# Patient Record
Sex: FEMALE | Race: WHITE | NOT HISPANIC OR LATINO | URBAN - METROPOLITAN AREA
[De-identification: names, ages, dates, MRNs, and addresses within clinical notes are randomized per-mention and may not be internally consistent; named-entity substitution may affect disease eponyms.]

---
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
## 2025-03-20 NOTE — PROCEDURE: MEDICATION COUNSELING
MEDICAL ONCOLOGY - NEW PATIENT VISIT    Best Contact Phone Number(s): [phone] (home)      Cancer/Stage/TNM:    Cancer Staging   Squamous Cell Carcinoma of right lower lobe of lung  Staging form: Lung, AJCC 8th Edition  - Clinical stage from 1/11/2023: Stage IA2 (cT1b, cN0, cM0) - Signed by Xavier Deng MD on 1/11/2023       Reason for visit: Metachronous recurrence of RCC      Treatment:  08/2013    Partial nephrectomy      HPI: Sarina Genao is a 76 y.o. female found to have metastatic ccRCC 01/2025 in the setting of incidental finding of an anterior abdominal wall mass during surveillance of prior lung cancer. She has remote history of grade 2 ccRCC sp partial nephrectomy 08/2013.  Subsquently underwent radiation therapy to an early stage squamous cell lung cancer 01/2023. Around this time, was noted to have abdominal wall subcutaneous nodule.  FDG PET 11/2024 showed progressive 3.9cm anterior abdominal wall mass; biopsy 01/2025 consistetn with metatsatic ccRCC. There was also a 2.3 cm left pulmonary nodule of concern and an additional RITA nodule. She presents to medical oncology clinic for further treatment planning.    History has been obtained by chart review and discussion with the patient.    Interval Events:    Had not smoked in 2 weeks because of the flu. Did have one cigarette yesterday and today. No complaints otherwise. Plan to see rad onc next week for RT to right lung nodule.         Oncology History   Renal cell carcinoma   8/6/2013 Surgery    08/06/2013: Partial right nephrectomy.   Procedure: Partial nephrectomy. Specimen Laterality: Right Tumor Size: 4 cm in greatest dimension. Tumor Focality: Unifocal. Macroscopic Extent Of Tumor: Tumor limited to the kidney. Histologic Type: Clear cell renal cell carcinoma. Sarcomatoid features: Not identified. Histologic Grade (Heron nuclear grade): G2. Microscopic Tumor Extension: Tumor limited to the kidney Margins: Uninvolved by invasive  carcinoma. Pathologic Staging: Primary tumor: pT1a Regional lymph node: pNx Distant metastasis: Not applicable.      11/25/2022 Imaging Significant Findings    11/25/22 PET CT  Impression:  - Hypermetabolic 1.5 cm nodule in the right lower lobe of the lung concerning for primary neoplasm.  No hypermetabolic lymphadenopathy.  - Round soft tissue structure within the anterior abdominal wall measuring up to 2.3 cm with mild radiotracer uptake.  Recommend further assessment with dedicated ultrasound and/or percutaneous sampling as clinically warranted.  - Additional pulmonary nodules without significant FDG avidity as above.  Findings include a new 3 mm nodule in the left upper lobe.  Of note, subcentimeter nodules are likely too small to characterize with PET.  Recommend continued CT surveillance.     11/22/2024 Imaging Significant Findings    11/22/24 FDG PET  Impression:  - Patient with history of right lower lobe non-small cell lung cancer status post radiation therapy.  No evidence of tracer avid disease or metastasis.  - Interval enlargement of a 3.9 cm anterior abdominal wall mass with mild increased tracer uptake.  Recommend further evaluation with ultrasound and tissue sampling is warranted.     1/6/2025 Biopsy    01/06/25 Biopsy    RIGHT ABDOMINAL WALL, CT-GUIDED BIOPSY WITH PATHOLOGIST ASSISTED ADEQUACY (CYTOLOGY AND CELL BLOCK):  Positive for malignancy, consistent with renal cell carcinoma.  See comment.        1/31/2025 Imaging Significant Findings    1/31/25 Tc99m Bone scan  Impression:  - There is no scintigraphic evidence of osteoblastic metastatic disease.    1/31/25 CT torso  Impression:  - Decreased size of a right anterior abdominal wall mass.  - Stable pulmonary nodules.     4/10/2025 -  Chemotherapy    Treatment Summary   Plan Name: NIVOLUMAB 480MG Q4W + CABOZANTINIB 40MG QD  Treatment Goal: Palliative  Status: Active  Start Date: 4/10/2025 (Planned)  End Date: 7/4/2025 (Planned)  Provider: Paxton  QUIN Santiago MD  Chemotherapy: cabozantinib (CABOMETYX) 40 mg Tab, 40 mg, Oral, Daily, 0 of 1 cycle, Start date: 4/10/2025, End date: 5/8/2025     Squamous Cell Carcinoma of right lower lobe of lung   10/25/2022 Biopsy    10/25/22 Lung biopsy  1. Lung, right lower lobe, endobronchial brushing, cytologic evaluation with  cell block:  Atypical cells present.  Epithelial atypia.  Findings suggestive of squamous cell carcinoma  2. Lung, right lower lobe, endoscopic fine needle aspiration, cytologic  evaluation with cell block:  Positive for malignancy. Lung squamous cell carcinoma.  3. Lung, right lower lobe, transbronchial biopsy, cytologic evaluation with  cell block:  Positive for malignancy, Lung squamous cell carcinoma  - PD-L1 and Lung NGS Panel have been ordered and results will be issued in a  supplemental report.  4. Lung, right lower lobe, bronchoalveolar lavage, cytologic evaluation with  cell block:  Negative for malignant cells.  Reactive bronchial cells.  Macrophages.  5. Lung, left lower lobe, endoscopic fine needle aspiration, cytologic  evaluation with cell block:  Negative for malignant cells.  Reactive bronchial cells.  Macrophages.  6. Lymph node, Station 7, endoscopic fine needle aspiration, cytologic  evalu ation with cell block:  Negative for malignant cells. Reactive bronchial cells.  Lymphocytes present.      11/25/2022 Imaging Significant Findings    11/25/22 PET CT  Impression:  - Hypermetabolic 1.5 cm nodule in the right lower lobe of the lung concerning for primary neoplasm.  No hypermetabolic lymphadenopathy.  - Round soft tissue structure within the anterior abdominal wall measuring up to 2.3 cm with mild radiotracer uptake.  Recommend further assessment with dedicated ultrasound and/or percutaneous sampling as clinically warranted.  - Additional pulmonary nodules without significant FDG avidity as above.  Findings include a new 3 mm nodule in the left upper lobe.  Of note, subcentimeter  nodules are likely too small to characterize with PET.  Recommend continued CT surveillance.     11/30/2022 Initial Diagnosis    Squamous Cell Carcinoma of right lower lobe of lung     1/11/2023 Cancer Staged    Staging form: Lung, AJCC 8th Edition  - Clinical stage from 1/11/2023: Stage IA2 (cT1b, cN0, cM0)     1/17/2023 Imaging Significant Findings    01/17/23 CT Chest  Impression:  1. Interval enlargement of a right lower lobe nodule, now 2.3 cm maximally (previously 1.5 cm).  This was noted hypermetabolic on the prior PET-CT 09/29/2022.  2. There is a newly seen peripheral, right lower lobe irregular nodule up to 1.3 cm. v this was a site of thoracotomy and may be postsurgical in nature.  3. There other pulmonary nodules as described above which do not appear changed.  There is no new lymphadenopathy detected.  4. There is a new small right pleural effusion and peripheral airspace opacities in the right lung.  This may reflect infectious/inflammatory etiology.  Alternatively, this may reflect involving appearance of postsurgical change.     1/26/2023 - 2/3/2023 Radiation Therapy    Treating physician: Xavier Deng    Site Technique Energy Dose/Fx (Gy) #Fx Total Dose (Gy)   RLL Lung SBRT 6X 12.5 4 / 4 50         Biopsy       4/26/2023 Imaging Significant Findings    04/26/23 CT Chest  Impression:  1. Interval decrease in size of a right lower lobe irregular nodule with adjacent architectural distortion.  Consistent with post treatment changes.  2. Other findings as above without significant interval change.     5/10/2024 Imaging Significant Findings    5/10/24 CT Chest  Impression:  1. Stable bilateral pulmonary nodules, including right lower lobe spiculated nodule.  No new or enlarging nodules.  2. Additional findings as above.     11/13/2024 Imaging Significant Findings    11/13/24 CT Chest  Impression:  1. Stable bilateral pulmonary nodules, including right lower lobe spiculated nodule.  No new or enlarging  nodules.  2. Additional findings as above.     11/22/2024 Imaging Significant Findings    11/22/24 FDG PET  Impression:  - Patient with history of right lower lobe non-small cell lung cancer status post radiation therapy.  No evidence of tracer avid disease or metastasis.  - Interval enlargement of a 3.9 cm anterior abdominal wall mass with mild increased tracer uptake.  Recommend further evaluation with ultrasound and tissue sampling is warranted.          Past Medical History:   Diagnosis Date    Anticoagulant long-term use     Cancer     Kidney (Right)    Cataracts, bilateral     CKD (chronic kidney disease) stage 3, GFR 30-59 ml/min 12/15/2014    Colon polyps     Combined hyperlipidemia associated with type 2 diabetes mellitus 6/7/2013    COPD (chronic obstructive pulmonary disease) 12/15/2014    Diabetes mellitus type II     NIDDM, a.m. glucose-120s-130s-last A1c-7.1-6/7/13    Diabetes mellitus with renal manifestations, uncontrolled 2/1/2017    Diabetic retinopathy     Family history of stomach cancer 12/5/2013    Former smoker     H/O renal cell carcinoma 12/15/2015    History of colonic polyps 2/1/2017    3 polyps 7/13 ---5 yrs    History of gastroesophageal reflux (GERD)     History of urinary incontinence     s/p bladder lift-october, 2011 (at [facility])    Hyperlipidemia     Hypertension     120s/70s-80s    Nephrolithiasis     Osteoarthritis of thumb 12/20/2019    Osteoporosis, post-menopausal 3/17/2014    Primary hyperparathyroidism 10/20/2016    Schatzki's ring 2/1/2017    Dilated 2014        Past Surgical History:   Procedure Laterality Date    bladder lift  2011    done at [facility]    BLADDER STONE REMOVAL  2011    before bladder lift    CATARACT EXTRACTION W/  INTRAOCULAR LENS IMPLANT Left 06/07/2016    Dr. Vásquez    CATARACT EXTRACTION W/  INTRAOCULAR LENS IMPLANT Right 06/21/2016    Dr. Vásquez    COLONOSCOPY N/A 4/26/2018    Procedure: COLONOSCOPY;  Surgeon: Benjamin Zamora MD;  Location: [facility]  ENDO;  Service: Endoscopy;  Laterality: N/A;  confirmed ss    COLONOSCOPY N/A 12/21/2023    Procedure: COLONOSCOPY;  Surgeon: Jamel Luis MD;  Location: [facility] ENDO;  Service: Endoscopy;  Laterality: N/A;  9/7 ref Venancio Mayer MD, Suprep, instr. mailed, [facility]  12/14- instr reviewed, pt reminded to hold ozempic, pre call complete.  DBM    CYSTOSCOPY      INJECTION OF ANESTHETIC AGENT AROUND MULTIPLE INTERCOSTAL NERVES  12/27/2022    Procedure: BLOCK, NERVE, INTERCOSTAL, 2 OR MORE;  Surgeon: Paxton Cannon MD;  Location: [facility] OR 2ND FLR;  Service: Thoracic;;    LYSIS OF ADHESIONS  12/27/2022    Procedure: LYSIS, ADHESIONS;  Surgeon: Paxton Cannon MD;  Location: [facility] OR 2ND FLR;  Service: Thoracic;;    NAVIGATIONAL BRONCHOSCOPY N/A 12/11/2018    Procedure: BRONCHOSCOPY, NAVIGATIONAL;  Surgeon: Ashtyn Ramires MD;  Location: [facility] OR 2ND FLR;  Service: Pulmonary;  Laterality: N/A;    NEPHRECTOMY      partial right    ROBOTIC BRONCHOSCOPY N/A 10/25/2022    Procedure: ROBOTIC BRONCHOSCOPY;  Surgeon: Ashtyn Ramiers MD;  Location: [facility] OR 2ND FLR;  Service: Pulmonary;  Laterality: N/A;    ROBOTIC BRONCHOSCOPY N/A 2/28/2025    Procedure: ROBOTIC BRONCHOSCOPY;  Surgeon: Ashtyn Ramires MD;  Location: [facility] OR 2ND FLR;  Service: Pulmonary;  Laterality: N/A;    VIDEO-ASSISTED THORACOSCOPIC SURGERY (VATS)  12/27/2022    Procedure: VATS (VIDEO-ASSISTED THORACOSCOPIC SURGERY);  Surgeon: Paxton Cannon MD;  Location: [facility] OR 2ND FLR;  Service: Thoracic;;        Family History   Problem Relation Name Age of Onset    Glaucoma Mother      Cataracts Mother      No Known Problems Father      Colon cancer Brother      Nephrolithiasis Sister      No Known Problems Maternal Aunt      No Known Problems Maternal Uncle      No Known Problems Paternal Aunt      No Known Problems Paternal Uncle      No Known Problems Maternal Grandmother      No Known Problems Maternal Grandfather      No Known Problems Paternal Grandmother       No Known Problems Paternal Grandfather      Anesthesia problems Neg Hx      Kidney cancer Neg Hx      Amblyopia Neg Hx      Blindness Neg Hx      Cancer Neg Hx      Diabetes Neg Hx      Hypertension Neg Hx      Macular degeneration Neg Hx      Retinal detachment Neg Hx      Strabismus Neg Hx      Stroke Neg Hx      Thyroid disease Neg Hx          Social History     Tobacco Use    Smoking status: Every Day     Current packs/day: 0.25     Average packs/day: 0.3 packs/day for 30.0 years (7.5 ttl pk-yrs)     Types: Cigarettes    Smokeless tobacco: Never   Substance Use Topics    Alcohol use: No     Alcohol/week: 0.0 standard drinks of alcohol        I have reviewed and updated the patient's past medical, surgical, family and social histories.    Review of patient's allergies indicates:   Allergen Reactions    Pantoprazole Other (See Comments)     Elevated Blood Sugars    Metformin Diarrhea        Current Outpatient Medications   Medication Sig Dispense Refill    amLODIPine (NORVASC) 10 MG tablet TAKE ONE TABLET BY MOUTH ONCE A DAY 90 tablet 3    aspirin (ECOTRIN) 81 MG EC tablet Take 81 mg by mouth once daily.      blood sugar diagnostic Strp Dispense: Test strip to check glucose 2 times a day, ICD-10: E11.65, compatible with insurance/glucometer, dispense enough for 90 day supply 300 each 3    blood-glucose meter kit Dispense: 1 glucometer, use to check glucose 2 times a day, ICD-10: E11.65,  Dispense machine covered by insurance 1 each 0    [START ON 4/10/2025] cabozantinib (CABOMETYX) 40 mg Tab Take 40 mg by mouth once daily Take on an empty stomach.  Fasting is required from 2 hrs before through 1 hr after each dose.  Do not crush or chew. Avoid grapefruit or grapefruit juice which may increase exposure of cabozantinib. Avoid Сергей's wort which may decrease exposure of cabozantinib.. 30 tablet 5    cyanocobalamin, vitamin B-12, 1,000 mcg TbSR Take by mouth once daily.      famotidine (PEPCID) 40 MG tablet TAKE  "ONE TABLET BY MOUTH ONCE A DAY 90 tablet 0    furosemide (LASIX) 20 MG tablet TAKE ONE TABLET BY MOUTH ONCE A DAY AS NEEDED 30 tablet 3    irbesartan (AVAPRO) 300 MG tablet Take 1 tablet (300 mg total) by mouth once daily. 90 tablet 3    lancets Misc Dispense: Lancets use to check glucose 2 times a day, ICD-10: E11.65, dispense enough for 90 day supply 300 each 3    lancing device Misc One device, used to check blood glucose, ICD-10: E11.65 1 each 0    nicotine polacrilex 2 MG Lozg Take 1 lozenge (2 mg total) by mouth as needed (smoking cessation). 60 lozenge 5    omeprazole (PRILOSEC) 40 MG capsule Take 1 capsule (40 mg total) by mouth once daily. 90 capsule 4    oxybutynin (DITROPAN-XL) 5 MG TR24 TAKE ONE TABLET BY MOUTH ONCE A DAY 90 tablet 3    OZEMPIC 0.25 mg or 0.5 mg (2 mg/3 mL) pen injector INJECT .5 MILLIGRAM UNDER THE SKIN ONCE EVERY 7 DAYS 3 mL 5    pen needle, diabetic (BD INSULIN PEN NEEDLE UF SHORT) 31 gauge x 5/16" Ndle USE AS DIRECTED 100 each 12    rosuvastatin (CRESTOR) 20 MG tablet TAKE ONE TABLET BY MOUTH ONCE A DAY 90 tablet 12    traMADoL (ULTRAM) 50 mg tablet Take 1 tablet (50 mg total) by mouth every 6 (six) hours as needed for Pain. 120 tablet 5    vitamin D (VITAMIN D3) 1000 units Tab Take 1,000 Units by mouth once daily.       No current facility-administered medications for this visit.        Physical Exam:   /63 (BP Location: Left arm, Patient Position: Sitting)   Pulse 67   Temp 98 °F (36.7 °C) (Temporal)   Resp 18   Ht 5' 3" (1.6 m)   Wt 56.7 kg (125 lb)   SpO2 96%   BMI 22.14 kg/m²      ECOG Performance status: 1            Physical Exam  Constitutional:       General: She is not in acute distress.     Appearance: She is normal weight.   HENT:      Head: Normocephalic.   Eyes:      General: No scleral icterus.     Conjunctiva/sclera: Conjunctivae normal.   Cardiovascular:      Rate and Rhythm: Normal rate.   Pulmonary:      Effort: Pulmonary effort is normal. No " respiratory distress.   Abdominal:      General: There is no distension.      Palpations: Abdomen is soft.   Musculoskeletal:         General: Normal range of motion.      Cervical back: Normal range of motion and neck supple.   Skin:     General: Skin is warm.      Coloration: Skin is not jaundiced.   Neurological:      General: No focal deficit present.      Mental Status: She is alert and oriented to person, place, and time.   Psychiatric:         Mood and Affect: Mood normal.         Behavior: Behavior normal.         Thought Content: Thought content normal.           Labs:   Recent Results (from the past 48 hours)   Vitamin D    Collection Time: 03/20/25  8:56 AM   Result Value Ref Range    Vit D, 25-Hydroxy 32 30 - 96 ng/mL   HEMOGLOBIN A1C    Collection Time: 03/20/25  8:56 AM   Result Value Ref Range    Hemoglobin A1C 7.2 (H) 4.0 - 5.6 %    Estimated Avg Glucose 160 (H) 68 - 131 mg/dL   Comprehensive Metabolic Panel    Collection Time: 03/20/25  8:56 AM   Result Value Ref Range    Sodium 140 136 - 145 mmol/L    Potassium 3.1 (L) 3.5 - 5.1 mmol/L    Chloride 101 95 - 110 mmol/L    CO2 27 23 - 29 mmol/L    Glucose 93 70 - 110 mg/dL    BUN 19 8 - 23 mg/dL    Creatinine 1.4 0.5 - 1.4 mg/dL    Calcium 10.1 8.7 - 10.5 mg/dL    Total Protein 7.3 6.0 - 8.4 g/dL    Albumin 3.3 (L) 3.5 - 5.2 g/dL    Total Bilirubin 0.7 0.1 - 1.0 mg/dL    Alkaline Phosphatase 101 40 - 150 U/L    AST 11 10 - 40 U/L    ALT 5 (L) 10 - 44 U/L    eGFR 39.0 (A) >60 mL/min/1.73 m^2    Anion Gap 12 8 - 16 mmol/L   CBC W/ AUTO DIFFERENTIAL    Collection Time: 03/20/25  8:56 AM   Result Value Ref Range    WBC 13.28 (H) 3.90 - 12.70 K/uL    RBC 5.40 4.00 - 5.40 M/uL    Hemoglobin 14.6 12.0 - 16.0 g/dL    Hematocrit 45.7 37.0 - 48.5 %    MCV 85 82 - 98 fL    MCH 27.0 27.0 - 31.0 pg    MCHC 31.9 (L) 32.0 - 36.0 g/dL    RDW 14.3 11.5 - 14.5 %    Platelets 418 150 - 450 K/uL    MPV 8.9 (L) 9.2 - 12.9 fL    Immature Granulocytes 1.0 (H) 0.0 - 0.5 %     Gran # (ANC) 10.8 (H) 1.8 - 7.7 K/uL    Immature Grans (Abs) 0.13 (H) 0.00 - 0.04 K/uL    Lymph # 1.3 1.0 - 4.8 K/uL    Mono # 1.0 0.3 - 1.0 K/uL    Eos # 0.1 0.0 - 0.5 K/uL    Baso # 0.02 0.00 - 0.20 K/uL    nRBC 0 0 /100 WBC    Gran % 81.0 (H) 38.0 - 73.0 %    Lymph % 9.9 (L) 18.0 - 48.0 %    Mono % 7.4 4.0 - 15.0 %    Eosinophil % 0.5 0.0 - 8.0 %    Basophil % 0.2 0.0 - 1.9 %    Differential Method Automated             Imaging:       I have personally reviewed the above imaging    Path:   Reviewed pathology as documented in oncology history      Assessment and Plan:   1. Renal cell carcinoma, unspecified laterality  Overview:  Metachronous recurrence of ccRCC. Initial diagnosis in 2013 sp partial nephrectomy. Has biopsy proven recurrence to abdominal wall 01/2025.     Assessment & Plan:  Plan for RT to right lung nodule despite negative biopsy - felt to be clinically consistent with metastatic disease. Surgery to abdominal mass will entail signficant abdominal wall defect requiring repair and potential for poor wound healing given her ongoing smoking.  - Will start nivo/cabo in 4 weeks  - CT abdomen and pelvis with contrast prior to start of treatment  - NP appt 1-2 weeks for IO education  - Follow up with RT next week  - Encouraged smoking cessation, can reconsider surgery to abdominal wall mass if she stops smoking  - Standing CBC, CMP, TSH q4 weeks  - Urine P/C ratio once prior to starting cabo    Orders:  -     cabozantinib (CABOMETYX) 40 mg Tab; Take 40 mg by mouth once daily Take on an empty stomach.  Fasting is required from 2 hrs before through 1 hr after each dose.  Do not crush or chew. Avoid grapefruit or grapefruit juice which may increase exposure of cabozantinib. Avoid Starbuck's wort which may decrease exposure of cabozantinib..  Dispense: 30 tablet; Refill: 5  -     CBC W/ AUTO DIFFERENTIAL; Standing  -     CMP; Standing  -     TSH; Standing  -     Protein / creatinine ratio, urine  -     CT  Abdomen With IV Contrast Routine Oral Contrast; Future; Expected date: 03/20/2025    2. Tobacco abuse  -     nicotine polacrilex 2 MG Lozg; Take 1 lozenge (2 mg total) by mouth as needed (smoking cessation).  Dispense: 60 lozenge; Refill: 5    3. Immunotherapy treatment  -     TSH; Standing      Follow up:   Route Chart for Scheduling    Med Onc Chart Routing      Follow up with physician 4 weeks. To start nivo/cabo   Follow up with HANG 2 weeks. For immunotherapy education   Infusion scheduling note    Injection scheduling note    Labs CBC, CMP, TSH and other   Scheduling:  Preferred lab:  Lab interval: every 4 weeks  Standing labs every 4 weeks with nivolumab infusion; urine protein to creatinine ratio once on am of next provider appt   Imaging   CT A/P with contrast, schedule for 1 week prior to MD appt   Pharmacy appointment    Other referrals                  Treatment Plan Information   NIVOLUMAB 480MG Q4W + CABOZANTINIB 40MG QD Paxton Santiago MD   Associated diagnosis: Renal cell carcinoma   noted on 8/12/2013   Line of treatment: First Line  Treatment Goal: Palliative     Upcoming Treatment Dates - NIVOLUMAB 480MG Q4W + CABOZANTINIB 40MG QD    4/10/2025       Take Home Chemotherapy       cabozantinib (CABOMETYX) 40 mg Tab  4/11/2025       Chemotherapy       nivolumab 480 mg in 0.9% NaCl 98 mL infusion  5/9/2025       Chemotherapy       nivolumab 480 mg in 0.9% NaCl 98 mL infusion  6/6/2025       Chemotherapy       nivolumab 480 mg in 0.9% NaCl 98 mL infusion    Therapy Plan Information  DENOSUMAB (PROLIA) Q6M for Osteoporosis, post-menopausal, noted on 3/17/2014  Medications  denosumab (PROLIA) injection 60 mg  60 mg, Subcutaneous, Every 26 weeks      No therapy plan of the specified type found.    No therapy plan of the specified type found.      The above information has been reviewed with the patient and all questions have been answered to their apparent satisfaction.  They understand that they can call  the clinic with any questions.      Eric De La O DO  Hematology/Oncology Fellow, PGY-IV  [facility]         Hydroxyzine Counseling: Patient advised that the medication is sedating and not to drive a car after taking this medication.  Patient informed of potential adverse effects including but not limited to dry mouth, urinary retention, and blurry vision.  The patient verbalized understanding of the proper use and possible adverse effects of hydroxyzine.  All of the patient's questions and concerns were addressed.
